# Patient Record
Sex: FEMALE | Race: WHITE | NOT HISPANIC OR LATINO | Employment: FULL TIME | URBAN - METROPOLITAN AREA
[De-identification: names, ages, dates, MRNs, and addresses within clinical notes are randomized per-mention and may not be internally consistent; named-entity substitution may affect disease eponyms.]

---

## 2023-07-21 ENCOUNTER — OFFICE VISIT (OUTPATIENT)
Dept: URGENT CARE | Facility: CLINIC | Age: 58
End: 2023-07-21
Payer: COMMERCIAL

## 2023-07-21 VITALS
DIASTOLIC BLOOD PRESSURE: 100 MMHG | HEART RATE: 89 BPM | OXYGEN SATURATION: 96 % | WEIGHT: 225 LBS | BODY MASS INDEX: 34.1 KG/M2 | HEIGHT: 68 IN | SYSTOLIC BLOOD PRESSURE: 168 MMHG | TEMPERATURE: 99 F | RESPIRATION RATE: 17 BRPM

## 2023-07-21 DIAGNOSIS — L02.01 CUTANEOUS ABSCESS OF FACE: Primary | ICD-10-CM

## 2023-07-21 PROCEDURE — 99202 OFFICE O/P NEW SF 15 MIN: CPT | Mod: ,,, | Performed by: FAMILY MEDICINE

## 2023-07-21 PROCEDURE — 99202 PR OFFICE/OUTPT VISIT, NEW, LEVL II, 15-29 MIN: ICD-10-PCS | Mod: ,,, | Performed by: FAMILY MEDICINE

## 2023-07-21 NOTE — PATIENT INSTRUCTIONS
Discussed the physical finding, condition and course.  Topical warm compresses 3 to 4 times a day.  As tolerated.  Continue Keflex and Bactrim as directed.  Alternate Tylenol ibuprofen for pain and discomfort.  Can return to clinic for I&D as needed.    Patient and daughter voiced understanding.  Call this clinic for any questions

## 2023-07-21 NOTE — PROGRESS NOTES
"Subjective:      Patient ID: Diane Wilson is a 57 y.o. female.    Vitals:  height is 5' 8" (1.727 m) and weight is 102.1 kg (225 lb). Her temperature is 99 °F (37.2 °C). Her blood pressure is 168/100 (abnormal) and her pulse is 89. Her respiration is 17 and oxygen saturation is 96%.     Chief Complaint: OTHER (X Sunday- boil to left upper lip/ face- pain, redness, swelling- currently on keflex )    HPI:  57-year-old female otherwise healthy present to clinic with concerns of ongoing abscess upper lip on left side.  Started initially 6 days ago, after 2 days started on Bactrim.  Has taken 3 does.  Patient's daughter is a nurse practitioner who switched to Keflex.  Today was seen at dentist office, with concerns of possible teeth.  States no dental issues, abscess not related to the teeth or gums.  No fever.    ROS :  Constitutional : No Fever, No Body aches, No Chills  Neck : Negative except HPI  Respiratory : Negative for shortness of breath and wheezing  Cardiovascular : Negative for chest pain, No palpitations  Gastrointestinal : No nausea, No vomiting, No abdominal pain, No diarrhea  Muskeloskeletal : Negative except HPI  Integumentary : As Per HPI  Neurological : No tingling, No numbness, No weakness   Objective:     Physical Exam  General : Alert and oriented, No apparent distress, Afebrile  Neck -: supple, Non Tender  Respiratory :Lungs are clear to auscultate, Breath sounds are equal  Cardiovascular : Normal rate, normal volume pulse bilateral  Integumentary : Warm, Dry and upper lip left lateral:  3 cm area of induration, redness, tender to palpate.  No visible head.  Appears patient has daughter has used needle puncture today     Assessment:     1. Cutaneous abscess of face      Plan:   Discussed the physical finding, condition and course.  Topical warm compresses 3 to 4 times a day.  As tolerated.  Continue Keflex and Bactrim as directed.  Alternate Tylenol ibuprofen for pain and discomfort.  Can return to " clinic for I&D as needed.    Patient and daughter voiced understanding.  Call this clinic for any questions    Cutaneous abscess of face

## 2023-07-23 ENCOUNTER — OFFICE VISIT (OUTPATIENT)
Dept: URGENT CARE | Facility: CLINIC | Age: 58
End: 2023-07-23
Payer: COMMERCIAL

## 2023-07-23 VITALS
TEMPERATURE: 99 F | HEIGHT: 68 IN | RESPIRATION RATE: 17 BRPM | HEART RATE: 90 BPM | SYSTOLIC BLOOD PRESSURE: 180 MMHG | DIASTOLIC BLOOD PRESSURE: 96 MMHG | WEIGHT: 225 LBS | BODY MASS INDEX: 34.1 KG/M2 | OXYGEN SATURATION: 99 %

## 2023-07-23 DIAGNOSIS — K13.0 ABSCESS OF LIP: Primary | ICD-10-CM

## 2023-07-23 PROCEDURE — 99213 PR OFFICE/OUTPT VISIT, EST, LEVL III, 20-29 MIN: ICD-10-PCS | Mod: ,,,

## 2023-07-23 PROCEDURE — 99213 OFFICE O/P EST LOW 20 MIN: CPT | Mod: ,,,

## 2023-07-23 RX ORDER — METHYLPREDNISOLONE 4 MG/1
TABLET ORAL
COMMUNITY
Start: 2023-07-19

## 2023-07-23 RX ORDER — CEPHALEXIN 500 MG/1
500 CAPSULE ORAL 4 TIMES DAILY
COMMUNITY
Start: 2023-07-19

## 2023-07-23 RX ORDER — SULFAMETHOXAZOLE AND TRIMETHOPRIM 800; 160 MG/1; MG/1
1 TABLET ORAL 2 TIMES DAILY
COMMUNITY
Start: 2023-07-18

## 2023-07-23 NOTE — PROCEDURES
"Incision & Drainage    Date/Time: 7/23/2023 1:40 PM  Performed by: Sun Hitchcock NP  Authorized by: Sun Hitchcock NP     Time out: Immediately prior to procedure a "time out" was called to verify the correct patient, procedure, equipment, support staff and site/side marked as required.    Consent Done?:  Yes (Verbal)    Type:  Abscess  Body area:  Mouth (left upper inner lip)  Scalpel size: 18 g needle.  Complexity:  Simple  Drainage:  Purulent  Drainage amount:  Moderate  Patient tolerance:  Patient tolerated the procedure well with no immediate complications    Two single Punctures made to left upper lip, one of the outer & inner lip with 18 g needle, No post procedure bleeding  "

## 2023-07-23 NOTE — PROGRESS NOTES
"Subjective:      Patient ID: Diane Wilson is a 57 y.o. female.    Vitals:  height is 5' 8" (1.727 m) and weight is 102.1 kg (225 lb). Her temperature is 99 °F (37.2 °C). Her blood pressure is 180/96 (abnormal) and her pulse is 90. Her respiration is 17 and oxygen saturation is 99%.     Chief Complaint: Abscess (Pt was seen 07/21 by Dr Kimball dx with abscess to face. Would like drained)    Patient is a 57-year-old female who presents to urgent care clinic with complaints of abscess to left upper lip.  Patient was seen in clinic 2 days ago by Dr. Kimball and was told to continue oral antibiotics that she would already been placed on.  Patient is taking both Keflex and Bactrim.  Patient reports abscess started 1 week ago, and has since been seen by her dentist and told it was not a dental infection.  She scheduled follow-up with an oral surgeon tomorrow.    Abscess    Skin:  Positive for erythema and abscess.    Objective:     Physical Exam   Constitutional: She is oriented to person, place, and time. She appears well-developed.   HENT:   Head: Normocephalic and atraumatic. Head is without abrasion, without contusion and without laceration.   Ears:   Right Ear: External ear normal.   Left Ear: External ear normal.   Nose: Nose normal.   Mouth/Throat: Oropharynx is clear and moist and mucous membranes are normal.   Eyes: Conjunctivae, EOM and lids are normal. Pupils are equal, round, and reactive to light.   Neck: Trachea normal and phonation normal. Neck supple.   Cardiovascular: Normal rate, regular rhythm and normal heart sounds.   Pulmonary/Chest: Effort normal and breath sounds normal. No stridor. No respiratory distress.   Musculoskeletal: Normal range of motion.         General: Normal range of motion.   Neurological: She is alert and oriented to person, place, and time.   Skin: Skin is warm, dry, intact, no rash and abscessed. Capillary refill takes less than 2 seconds. erythema No abrasion, No burn, No bruising " and No ecchymosis         Comments: 1.5 cm area of erythema, induration with abscess noted to left upper lip.  Superficial head noted to the outside of the upper lip, large head noted to left inner lip with significant amount of induration, fluctuance, purulence.    Psychiatric: Her speech is normal and behavior is normal. Judgment and thought content normal.   Nursing note and vitals reviewed.    Assessment:     1. Abscess of lip        Plan:       Abscess of lip  -     Incision & Drainage                 Incision and drainage performed with 18 gauge needle to both outer and inner lip, scant amount of blood from puncture to the left outer lip w/ no purulence present, large amount of purulence drained from inside of the lip.        Continue taking previously prescribed antibiotics until complete.  Follow with oral surgeon tomorrow.  Tylenol and ibuprofen as needed for pain and inflammation.  Apply warm compresses to the area 3-4 times per day.  Follow-up with any questions or concerns.  Go to the emergency department with any significant changes or worsening of symptoms.